# Patient Record
Sex: FEMALE | URBAN - METROPOLITAN AREA
[De-identification: names, ages, dates, MRNs, and addresses within clinical notes are randomized per-mention and may not be internally consistent; named-entity substitution may affect disease eponyms.]

---

## 2018-01-30 ENCOUNTER — EMERGENCY (EMERGENCY)
Facility: HOSPITAL | Age: 48
LOS: 1 days | Discharge: HOME | End: 2018-01-30
Attending: EMERGENCY MEDICINE
Payer: COMMERCIAL

## 2018-01-30 VITALS
OXYGEN SATURATION: 99 % | WEIGHT: 141.98 LBS | RESPIRATION RATE: 18 BRPM | SYSTOLIC BLOOD PRESSURE: 106 MMHG | DIASTOLIC BLOOD PRESSURE: 68 MMHG | HEART RATE: 104 BPM | TEMPERATURE: 99 F

## 2018-01-30 PROCEDURE — 73630 X-RAY EXAM OF FOOT: CPT | Mod: 26,RT

## 2018-01-30 PROCEDURE — 73630 X-RAY EXAM OF FOOT: CPT

## 2018-01-30 PROCEDURE — 99053 MED SERV 10PM-8AM 24 HR FAC: CPT

## 2018-01-30 PROCEDURE — 99284 EMERGENCY DEPT VISIT MOD MDM: CPT | Mod: 25

## 2018-01-30 PROCEDURE — 99283 EMERGENCY DEPT VISIT LOW MDM: CPT | Mod: 25

## 2018-01-30 RX ORDER — IBUPROFEN 200 MG
1 TABLET ORAL
Qty: 20 | Refills: 0
Start: 2018-01-30

## 2018-01-30 RX ORDER — IBUPROFEN 200 MG
600 TABLET ORAL ONCE
Refills: 0 | Status: COMPLETED | OUTPATIENT
Start: 2018-01-30 | End: 2018-01-30

## 2018-01-30 RX ADMIN — Medication 600 MILLIGRAM(S): at 03:02

## 2018-01-30 NOTE — ED PROVIDER NOTE - PHYSICAL EXAMINATION
Right foot: + dorsal area tenderness and mild swelling.   no bony deformities, no leg length discrepancy, femoral and pedal pulses intact, cap refill  < 2 secs.

## 2018-01-30 NOTE — ED PROVIDER NOTE - OBJECTIVE STATEMENT
Pt struck dorsal aspect of right foot on metal chair yesterday evening. =tenderness and swelling. No calf tenderness. no shortness of breath, no chest pain. Pt is no on BCP and denies sedentary life style.

## 2018-01-30 NOTE — ED PROVIDER NOTE - MEDICAL DECISION MAKING DETAILS
Rx IBU, F/u with podiatry- contact provided. Pt limping, ACE wrap and hard shoe applied. Pt is well appearing proper crutch usage noted. , stable for discharge and follow up with medical doctor. Pt educated on care and need for follow up. Discussed anticipatory guidance and return precautions. Questions answered. I had a detailed discussion with the patient and/or guardian regarding the historical points, exam findings, and any diagnostic results supporting the discharge diagnosis.

## 2019-10-23 ENCOUNTER — EMERGENCY (EMERGENCY)
Facility: HOSPITAL | Age: 49
LOS: 1 days | Discharge: HOME | End: 2019-10-23
Attending: EMERGENCY MEDICINE
Payer: COMMERCIAL

## 2019-10-23 VITALS
SYSTOLIC BLOOD PRESSURE: 132 MMHG | OXYGEN SATURATION: 99 % | RESPIRATION RATE: 16 BRPM | HEART RATE: 78 BPM | WEIGHT: 145.06 LBS | HEIGHT: 70 IN | DIASTOLIC BLOOD PRESSURE: 90 MMHG | TEMPERATURE: 98 F

## 2019-10-23 PROCEDURE — 99283 EMERGENCY DEPT VISIT LOW MDM: CPT

## 2019-10-23 PROCEDURE — 99284 EMERGENCY DEPT VISIT MOD MDM: CPT

## 2019-10-23 RX ORDER — CLINDAMYCIN PHOSPHATE GEL USP, 1% 10 MG/G
1 GEL TOPICAL
Qty: 7 | Refills: 0
Start: 2019-10-23 | End: 2019-10-29

## 2019-10-23 RX ORDER — ACETAMINOPHEN 500 MG
3 TABLET ORAL
Qty: 100 | Refills: 0
Start: 2019-10-23

## 2019-10-23 NOTE — ED ADULT TRIAGE NOTE - CHIEF COMPLAINT QUOTE
came in w/ c/o left tampon inside her vagina does not know how many days. denies any discharge/fever nor abdominal pain

## 2019-10-23 NOTE — ED PROVIDER NOTE - OBJECTIVE STATEMENT
Pt suspects she has retained tampon in vagina x 1 week. +malodor from vagina. No fever, no vomiting, denies vaginal discharge or bleeding.

## 2019-10-23 NOTE — ED PROVIDER NOTE - CLINICAL SUMMARY MEDICAL DECISION MAKING FREE TEXT BOX
Tampon removed no complications. Pt is well appearing walking with normal gait, stable for discharge and follow up with medical doctor. Pt educated on care and need for follow up. Discussed anticipatory guidance and return precautions. Questions answered. I had a detailed discussion with the patient and/or guardian regarding the historical points, exam findings, and any diagnostic results supporting the discharge diagnosis.

## 2019-10-23 NOTE — ED PROVIDER NOTE - PHYSICAL EXAMINATION
No labial lesions, os closed, no CMT, no adnexal tenderness or masses, no bleeding or d/c.  =retained tampon in vagina. Female chaperone present: Marilee PCA.

## 2019-10-23 NOTE — ED PROVIDER NOTE - PATIENT PORTAL LINK FT
You can access the FollowMyHealth Patient Portal offered by [facility] by registering at the following website: [email or web address]. By joining [employer]’s FollowMyHealth portal, you will also be able to view your health information using other applications (apps) compatible with our system.

## 2019-10-23 NOTE — ED ADULT TRIAGE NOTE - WEIGHT IN KG
PRINCIPAL PROCEDURE  Procedure: Diagnostic laparoscopy  Findings and Treatment: no sex nothing in vagina no heavy lifting no pushing eat high fiber food ambulation daily as tolerated, remove dressing tomorrow, leave steri strips on, shower daily clean wound well and keep dry after; see your OB in 2 days for repeat BHCG and  in 2wks for postop follow up
65.8

## 2023-06-05 PROBLEM — Z00.00 ENCOUNTER FOR PREVENTIVE HEALTH EXAMINATION: Status: ACTIVE | Noted: 2023-06-05

## 2023-06-05 PROBLEM — I82.409 ACUTE EMBOLISM AND THROMBOSIS OF UNSPECIFIED DEEP VEINS OF UNSPECIFIED LOWER EXTREMITY: Chronic | Status: ACTIVE | Noted: 2018-01-30

## 2023-06-05 PROBLEM — I49.3 VENTRICULAR PREMATURE DEPOLARIZATION: Chronic | Status: ACTIVE | Noted: 2018-01-30

## 2023-06-28 ENCOUNTER — APPOINTMENT (OUTPATIENT)
Dept: ORTHOPEDIC SURGERY | Facility: CLINIC | Age: 53
End: 2023-06-28

## 2024-02-08 ENCOUNTER — APPOINTMENT (OUTPATIENT)
Dept: ORTHOPEDIC SURGERY | Facility: CLINIC | Age: 54
End: 2024-02-08

## 2024-02-16 ENCOUNTER — APPOINTMENT (OUTPATIENT)
Dept: PAIN MANAGEMENT | Facility: CLINIC | Age: 54
End: 2024-02-16
Payer: COMMERCIAL

## 2024-02-16 VITALS — HEIGHT: 70 IN | WEIGHT: 155 LBS | BODY MASS INDEX: 22.19 KG/M2

## 2024-02-16 PROCEDURE — 99204 OFFICE O/P NEW MOD 45 MIN: CPT

## 2024-02-16 NOTE — DISCUSSION/SUMMARY
[de-identified] : After discussing various treatment options with the patient including but not limited to oral medications, physical therapy, exercise modalities as well as interventional spinal injections, we have decided with the following plan:  - Continue Home exercises, stretching, activity modification, physical therapy, and conservative care. - MRI report and/or images was reviewed and discussed with the patient. - Recommend Right L3-4, L4-5 Transforaminal Epidural Steroid Injection under fluoroscopic guidance with image. - The risks, benefits and alternatives of the proposed procedure were explained in detail with the patient. The risks outlined include but are not limited to infection, bleeding, post-dural puncture headache, nerve injury, a temporary increase in pain, failure to resolve symptoms, allergic reaction, symptom recurrence, and possible elevation of blood sugar in diabetics. All questions were answered to patient's apparent satisfaction and he/she verbalized an understanding. - Patient is presenting with acute/sub-acute radicular pain with impairment in ADLs and functionality.  The pain has not responded to conservative care including NSAID therapy and/or physical therapy.  There is no bleeding tendency, unstable medical condition, or systemic infection. - Follow up in 1-2 weeks post injection for re-evaluation.

## 2024-02-16 NOTE — PHYSICAL EXAM
[de-identified] : Constitutional; Appears well, no apparent distress Ability to communicate: Normal  Respiratory: non-labored breathing Skin: No rash noted Head: Normocephalic, atraumatic Neck: no visible thyroid enlargement Eyes: Extraocular movements intact Neurologic: Alert and oriented x3 Psychiatric: normal mood, affect and behavior [] : non-antalgic

## 2024-02-16 NOTE — REVIEW OF SYSTEMS
"Pt mack removed this morning. Pt urinated directly in the toilet and did not measure. He stated "it was a good amount."   " [Negative] : Heme/Lymph

## 2024-02-16 NOTE — HISTORY OF PRESENT ILLNESS
[Lower back] : lower back [8] : 8 [7] : 7 [Dull/Aching] : dull/aching [Sharp] : sharp [Shooting] : shooting [Intermittent] : intermittent [Sleep] : sleep [Physical therapy] : physical therapy [Standing] : standing [Walking] : walking [FreeTextEntry1] : Initial HPI 02/16/2024: Pain started a few years ago but worsened over the past few months. Pain is on the RIGHT side of the lower back and radiates into the right buttock and down the right anterior and lateral thigh and lower leg to the calf described as a sharp shooting pain with associated numbness and tingling.   MRI Lumbar Spine 2/7/24 independently reviewed: L3-4, L4-5 disc bulge with left HNP  Conservative Care: has been doing PT which is making it worse Pain Medications: gabapentin 300mg BID but was too drowsy; ibuprofen PRN; completed MDP with no relief  Past Injections: none Spine surgery: none  Blood thinners: none [] : Patient is currently injured and not playing sports: no [FreeTextEntry7] : RT LEG  [de-identified] : L MRI

## 2024-02-26 ENCOUNTER — APPOINTMENT (OUTPATIENT)
Dept: PAIN MANAGEMENT | Facility: CLINIC | Age: 54
End: 2024-02-26

## 2024-03-12 ENCOUNTER — APPOINTMENT (OUTPATIENT)
Dept: PAIN MANAGEMENT | Facility: CLINIC | Age: 54
End: 2024-03-12
Payer: COMMERCIAL

## 2024-03-12 PROCEDURE — 64484 NJX AA&/STRD TFRM EPI L/S EA: CPT | Mod: RT,59

## 2024-03-12 PROCEDURE — J3490M: CUSTOM

## 2024-03-12 PROCEDURE — 64483 NJX AA&/STRD TFRM EPI L/S 1: CPT | Mod: RT

## 2024-03-12 NOTE — PROCEDURE
[FreeTextEntry3] : Date of Service: 03/12/2024   Account: [id number]  Patient: ANTOLIN HASTINGS   YOB: 1970  Age: 53 year   Surgeon:                                                         Robson Mi D.O.  Assistant:                                                         None.  Pre-Operative Diagnosis:                             Lumbosacral radiculitis (M54.17)  Post-Operative Diagnosis:                           Same  Procedure:                                                      Right L3-4, L4-5 transforaminal epidural steroid injection under fluoroscopic guidance.  Anesthesia:                                                     Local with MAC   This procedure was carried out using fluoroscopic guidance.  The risks and benefits of the procedure were discussed extensively with the patient.  The consent of the patient was obtained and the following procedure was performed. The patient was placed in the prone position on the fluoroscopic table and the lumbar area was prepped and draped in a sterile fashion. A timeout was performed with all essential staff present and the site and side were verified.  The right L3-4 neural foramen was identified on right oblique "shanna dog" anatomical view at the 6 o'clock position using fluoroscopic guidance, and the area was marked. The overlying skin and subcutaneous structures were anesthetized using sterile technique with 1% Lidocaine.  A 22-gauge spinal needle was directed toward the inferior (6 o'clock) position of the pedicle, which formed the roof of the identified foramen.  Once in the epidural space, after negative aspiration for heme and CSF, 1 cc of Omnipaque contrast was injected under live fluoroscopy to confirm epidural location and assess filling defects and rule out intravascular needle placement. The following epidurogram observed: no intravascular or intrathecal flow pattern was noted.  No blood or CSF was aspirated. Contrast spread medially in epidural space.    After this, an injectate of 1 cc preservative free normal saline plus 6 mg of betamethasone and 1 cc of 0.25% bupivacaine was injected in the epidural space.  The right L4-5 neural foramen was then identified on right oblique "shanna dog" anatomical view at the 6 o'clock position using fluoroscopic guidance, and the area was marked. The overlying skin and subcutaneous structures were anesthetized using sterile technique with 1% Lidocaine.  A 22-gauge spinal needle was directed toward the inferior (6 o'clock) position of the pedicle, which formed the roof of the identified foramen.  Once in the epidural space, after negative aspiration for heme and CSF, 1cc of Omnipaque contrast was injected under live fluoroscopy to confirm epidural location and assess filling defects and rule out intravascular needle placement. The following epidurogram observed: no intravascular or intrathecal flow pattern was noted.  No blood or CSF was aspirated. Contrast spread medially in epidural space.    After this, an injectate of 1 cc preservative free normal saline plus 6 mg of betamethasone and 1 cc of 0.25% bupivacaine was injected in the epidural space.  The needle was subsequently removed.  Vital signs remained normal.  Pulse oximeter was used throughout the procedure and the patient's pulse and oxygen saturation remained within normal limits.  The patient tolerated the procedure well.  There were no complications.  The patient was instructed to apply ice over the injection sites for twenty minutes every two hours for the next 24 to 48 hours.  The patient was also instructed to contact me immediately if there were any problems.  Robson Mi D.O.

## 2024-03-29 ENCOUNTER — APPOINTMENT (OUTPATIENT)
Dept: PAIN MANAGEMENT | Facility: CLINIC | Age: 54
End: 2024-03-29
Payer: COMMERCIAL

## 2024-03-29 VITALS — WEIGHT: 155 LBS | HEIGHT: 70 IN | BODY MASS INDEX: 22.19 KG/M2

## 2024-03-29 PROCEDURE — 99213 OFFICE O/P EST LOW 20 MIN: CPT

## 2024-03-29 RX ORDER — METHOCARBAMOL 750 MG/1
750 TABLET, FILM COATED ORAL TWICE DAILY
Qty: 60 | Refills: 0 | Status: ACTIVE | COMMUNITY
Start: 2024-03-29 | End: 1900-01-01

## 2024-03-29 NOTE — DISCUSSION/SUMMARY
[de-identified] : After discussing various treatment options with the patient including but not limited to oral medications, physical therapy, exercise modalities as well as interventional spinal injections, we have decided with the following plan:  - Continue home exercises, stretching, activity modification, physical therapy, and conservative care. - Follow-up as needed. - Will prescribe Methocarbamol (Robaxin) 750mg TID PRN for muscle spasms and to assist with pain relief.

## 2024-03-29 NOTE — HISTORY OF PRESENT ILLNESS
[Lower back] : lower back [8] : 8 [7] : 7 [Dull/Aching] : dull/aching [Sharp] : sharp [Intermittent] : intermittent [Shooting] : shooting [Sleep] : sleep [Physical therapy] : physical therapy [Standing] : standing [Walking] : walking [FreeTextEntry1] : 03/29/2024: s/p RIGHT L3-4 L4-5 TFESI on 03/12/24 with 50% relief and improvement of ADLs. Pain is less severe and more intermittent.   Initial HPI 02/16/2024: Pain started a few years ago but worsened over the past few months. Pain is on the RIGHT side of the lower back and radiates into the right buttock and down the right anterior and lateral thigh and lower leg to the calf described as a sharp shooting pain with associated numbness and tingling.   MRI Lumbar Spine 2/7/24 independently reviewed: L3-4, L4-5 disc bulge with left HNP  Conservative Care: has been doing PT which is making it worse Pain Medications: gabapentin 300mg BID but was too drowsy; ibuprofen PRN; completed MDP with no relief; Mobic with no relief  Past Injections: none Spine surgery: none  Blood thinners: none [] : no [FreeTextEntry7] : RT LEG  [de-identified] : L MRI  [TWNoteComboBox1] : 50%

## 2024-03-29 NOTE — PHYSICAL EXAM
[de-identified] : Constitutional; Appears well, no apparent distress Ability to communicate: Normal  Respiratory: non-labored breathing Skin: No rash noted Head: Normocephalic, atraumatic Neck: no visible thyroid enlargement Eyes: Extraocular movements intact Neurologic: Alert and oriented x3 Psychiatric: normal mood, affect and behavior [] : non-antalgic

## 2024-04-03 PROBLEM — M54.50 LOW BACK PAIN, UNSPECIFIED BACK PAIN LATERALITY, UNSPECIFIED CHRONICITY, UNSPECIFIED WHETHER SCIATICA PRESENT: Status: ACTIVE | Noted: 2024-02-16

## 2024-04-04 ENCOUNTER — APPOINTMENT (OUTPATIENT)
Dept: PAIN MANAGEMENT | Facility: CLINIC | Age: 54
End: 2024-04-04
Payer: COMMERCIAL

## 2024-04-04 VITALS — BODY MASS INDEX: 22.19 KG/M2 | WEIGHT: 155 LBS | HEIGHT: 70 IN

## 2024-04-04 DIAGNOSIS — Z78.9 OTHER SPECIFIED HEALTH STATUS: ICD-10-CM

## 2024-04-04 DIAGNOSIS — Z80.9 FAMILY HISTORY OF MALIGNANT NEOPLASM, UNSPECIFIED: ICD-10-CM

## 2024-04-04 DIAGNOSIS — M54.50 LOW BACK PAIN, UNSPECIFIED: ICD-10-CM

## 2024-04-04 PROCEDURE — 99214 OFFICE O/P EST MOD 30 MIN: CPT

## 2024-04-04 RX ORDER — DICLOFENAC SODIUM 75 MG/1
75 TABLET, DELAYED RELEASE ORAL
Qty: 60 | Refills: 1 | Status: ACTIVE | COMMUNITY
Start: 2024-04-04 | End: 1900-01-01

## 2024-04-04 NOTE — DATA REVIEWED
[MRI] : MRI [Lumbar Spine] : lumbar spine [Report was reviewed and noted in the chart] : The report was reviewed and noted in the chart [I independently reviewed and interpreted images and report] : I independently reviewed and interpreted images and report [FreeTextEntry1] : 2/7/2024 (R) FINDINGS:  For purposes of this dictation, the last well-formed disc space will be labeled L5-S1.  OSSEOUS STRUCTURES: Vertebral body heights are preserved. No marrow edema or destructive marrow infiltrative process. 1.7 cm, left-sided L2 and smaller, L1 and L4 typical vertebral body hemangiomas are present. Benign finding.  ALIGNMENT: Normal lumbar lordosis is preserved.  No significant scoliosis. No spondylolisthesis. No spondylolysis within the limitations of MRI.  SPINAL CORD AND CONUS MEDULLARIS: Conus medullaris is at T12-L1. Distal thoracic spinal cord and conus medullaris are unremarkable.  PARASPINAL AND INTRA-ABDOMINAL SOFT TISSUES: No paraspinal masses.  INCLUDED THORACIC SPINE AND SACRUM: Unremarkable.  DISCS: Mild L3-4 L4-5 disc space narrowing.  The following axial levels are imaged and detailed below:  L1-L2: No disc bulging or herniation. No spinal canal or foraminal stenosis.  L2-L3: No disc bulging or herniation. No spinal canal or foraminal stenosis.  L3-L4: Mild disc bulge with superimposed small inferior left foraminal disc herniation (sagittal T2 FSE image 6, series 5). Mild left-sided foraminal narrowing. Right neural foramen patent. No spinal stenosis.  L4-L5: Mild disc bulging with superimposed small left foraminal disc herniation with mild/moderate left-sided foraminal narrowing. Right neural foramen patent. No spinal stenosis.  L5-S1: Minimal disc bulge. No disc herniation, spinal stenosis or foraminal narrowing.  IMPRESSION: 1. Mild L3-4 disc bulge with superimposed small inferior left foraminal disc herniation with mild left-sided foraminal narrowing. 2. Mild L4-5 disc bulge with superimposed small left foraminal disc herniation with mild/moderate left-sided foraminal narrowing.  X-ray hips (6/7/2023) IMPRESSION:   Minimal osteoarthropathy of the hips is identified.  No acute fracture is observed.  Mild hypertrophic arthropathy of the sacroiliac joints is observed.  No lytic or blastic lesion is identified.  Mild lower lumbar spondylosis is observed.

## 2024-04-04 NOTE — PHYSICAL EXAM
[de-identified] : Gen: NAD Head: NC/AT Eyes: no glasses, no scleral icterus ENT: mucous membranes moist CV: No JVD Lungs: nonlabored breathing Abd: soft, NT/ND Ext: full ROM in all extremities, no peripheral edema Back: +TTP in the right low lumbar facet region; +SLR on the right, +facet loading on the right Neuro: CN intact LEs +5 L +5 R hip flexion +5 L +5 R leg extension +5 L +5 R leg flexion +5 L +5 R foot dorsiflexion +5 L +5 R foot plantarflexion +5 L +5 R EHL extension Psych: normal affect Skin: no visible lesions

## 2024-04-04 NOTE — HISTORY OF PRESENT ILLNESS
[Lower back] : lower back [Buttock] : buttock [Right Leg] : right leg [5] : 5 [Burning] : burning [Dull/Aching] : dull/aching [Radiating] : radiating [Throbbing] : throbbing [Tightness] : tightness [Tingling] : tingling [Constant] : constant [Household chores] : household chores [Leisure] : leisure [Work] : work [Sleep] : sleep [Rest] : rest [Ice] : ice [Injection therapy] : injection therapy [Sitting] : sitting [Walking] : walking [Full time] : Work status: full time [] : no [FreeTextEntry1] : right hip;  [FreeTextEntry6] : numbness  [FreeTextEntry7] : right hip and leg

## 2024-04-04 NOTE — DISCUSSION/SUMMARY
[de-identified] : ANTOLIN HASTINGS is a 53 year-old woman presenting for a RPV for a history of chronic low back pain with radicular features.   Prior treatment: 3/12/2024: RIGHT L3-L4, L4-L5 TFESI w/ some short term improvement of pain Physical therapy Gabapentin - some sedation Ibuprofen Methocarbamol  Plan: 1) MRI lumbar spine images reviewed with the patient. 2) Schedule L4-L5 ILESI w/o MAC. The procedure was explained to the patient in detail. Reviewed risks, benefits, and alternatives with the patient. Some risks discussed included temporary increase in pain, bleeding, infection, and side effects from steroids. The patient expressed understanding and would like to proceed.  3) Trial diclofenac 75mg BID prn; Discussed the risks of NSAIDs, including worsening HTN, cardiovascular risks, GI risk, renal injury. The patient was told not to take other NSAIDs with this and to consult with their PCP if there is a significant medical history to warrant this prior to initiating treatment.  4) Continue methocarbamol 750mg prn 5) Continue gabapentin 300mg qhs  6) Discussed a variety of exercises and regular stretches and printed out various extension/flexion exercises and core strengthening exercises. Patient will start a daily stretching routine and eventually resume cardio workout. Also discussed the importance of stress reduction and good sleep hygiene. 7) RTC post procedure

## 2024-04-09 RX ORDER — GABAPENTIN 100 MG/1
100 CAPSULE ORAL 3 TIMES DAILY
Qty: 90 | Refills: 1 | Status: ACTIVE | COMMUNITY
Start: 2024-04-04 | End: 1900-01-01

## 2024-04-23 ENCOUNTER — APPOINTMENT (OUTPATIENT)
Dept: PAIN MANAGEMENT | Facility: CLINIC | Age: 54
End: 2024-04-23
Payer: COMMERCIAL

## 2024-04-23 PROCEDURE — 62323 NJX INTERLAMINAR LMBR/SAC: CPT

## 2024-04-23 NOTE — PROCEDURE
[FreeTextEntry1] : L4-L5 lumbar interlaminar epidural steroid injection [FreeTextEntry2] : chronic lumbar radiculopathy [FreeTextEntry3] : Procedure Date: 04/23/2024   Preoperative Diagnosis: chronic low back pain with bilateral sciatica   Procedure: L4-L5 epidural steroid injection under fluoroscopic guidance   Anesthesia: local   Complications: none   EBL: none   Procedure in detail: Patient was seen and examined. Risks, benefits, and alternatives for the procedure were discussed with the patient in detail. The patient expressed understanding, gave written and verbal consent, and placed themselves in a prone position on the procedure table. Skin overlying the lumbosacral spine was prepped with chloraprep and draped in the usual sterile fashion. Fluoroscopic images were obtained to identify the L4 and L5 vertebral body. Target was the interlaminar space between the L4 and L5 vertebral body. Skin overlying the target was marked and infiltrated with 1% lidocaine. Using an 20 gauge, 9cm Tuohy needle, this was inserted and advanced under intermittent fluoroscopic guidance. When felt to be engaged in the epidural space, lateral view was used to confirm depth. Needle was advanced until loss of resistance to saline was achieved. After negative aspiration for heme/csf, contrast was injected under live fluoroscopy, which showed contrast spread consistent with epidural flow. No evidence of intravascular or intrathecal uptake. At this point, a total of 3ml of injectate, which consisted of 1ml of 80mg/ml depomedrol and 2ml of 0.25% bupivacaine were injected. The needle was restyletted and withdrawn, a band aid was placed over the injection site. Patient tolerated the procedure well. The patient recovered uneventfully and was discharged home in stable condition.

## 2024-05-06 ENCOUNTER — APPOINTMENT (OUTPATIENT)
Dept: PAIN MANAGEMENT | Facility: CLINIC | Age: 54
End: 2024-05-06
Payer: COMMERCIAL

## 2024-05-06 VITALS — BODY MASS INDEX: 23.34 KG/M2 | WEIGHT: 163 LBS | HEIGHT: 70 IN

## 2024-05-06 PROCEDURE — 99213 OFFICE O/P EST LOW 20 MIN: CPT

## 2024-05-06 NOTE — DISCUSSION/SUMMARY
[de-identified] : ANTOLIN HASTINGS is a 53 year-old woman presenting for a RPV for a history of chronic low back pain with radicular features.   Prior treatment: 4/23/2024: L4-L5 ILESI w/o MAC w/ 70% improvement of pain and function 3/12/2024: RIGHT L3-L4, L4-L5 TFESI w/ some short term improvement of pain Physical therapy Gabapentin - some sedation Ibuprofen Methocarbamol Ice, Heat  Plan: 1) MRI lumbar spine images reviewed with the patient. 2) May consider repeat L4-L5 ILESI w/o MAC in the future if needed 3) Continue ibuprofen 600mg prn; denies AEs 4) Continue methocarbamol 750mg prn 5) Patient has stopped gabapentin 300mg qhs; may consider restarting  6) Discussed a variety of exercises and regular stretches and printed out various extension/flexion exercises and core strengthening exercises. Patient will start a daily stretching routine and eventually resume cardio workout. Also discussed the importance of stress reduction and good sleep hygiene. 7) RTC 6 weeks

## 2024-05-06 NOTE — HISTORY OF PRESENT ILLNESS
[Lower back] : lower back [Buttock] : buttock [Right Leg] : right leg [5] : 5 [Burning] : burning [Dull/Aching] : dull/aching [Radiating] : radiating [Throbbing] : throbbing [Tightness] : tightness [Tingling] : tingling [Constant] : constant [Household chores] : household chores [Leisure] : leisure [Work] : work [Sleep] : sleep [Rest] : rest [Ice] : ice [Injection therapy] : injection therapy [Sitting] : sitting [Walking] : walking [Full time] : Work status: full time [6] : 6 [] : no [FreeTextEntry1] : right hip;  [FreeTextEntry6] : numbness  [FreeTextEntry7] : right hip and leg

## 2024-05-06 NOTE — PHYSICAL EXAM
[de-identified] : Gen: NAD Head: NC/AT Eyes: no glasses, no scleral icterus ENT: mucous membranes moist CV: No JVD Lungs: nonlabored breathing Abd: soft, NT/ND Ext: full ROM in all extremities, no peripheral edema Back: +TTP in the right low lumbar facet region; +SLR on the right, +facet loading on the right Neuro: CN intact LEs +5 L +5 R hip flexion +5 L +5 R leg extension +5 L +5 R leg flexion +5 L +5 R foot dorsiflexion +5 L +5 R foot plantarflexion +5 L +5 R EHL extension Psych: normal affect Skin: no visible lesions

## 2024-05-12 ENCOUNTER — NON-APPOINTMENT (OUTPATIENT)
Age: 54
End: 2024-05-12

## 2024-06-05 PROBLEM — M54.17 LUMBOSACRAL RADICULITIS: Status: ACTIVE | Noted: 2024-02-16

## 2024-06-05 PROBLEM — M51.16 LUMBAR DISC DISEASE WITH RADICULOPATHY: Status: ACTIVE | Noted: 2024-05-02

## 2024-06-06 ENCOUNTER — APPOINTMENT (OUTPATIENT)
Dept: PAIN MANAGEMENT | Facility: CLINIC | Age: 54
End: 2024-06-06
Payer: COMMERCIAL

## 2024-06-06 VITALS — WEIGHT: 164 LBS | BODY MASS INDEX: 23.48 KG/M2 | HEIGHT: 70 IN

## 2024-06-06 DIAGNOSIS — M54.17 RADICULOPATHY, LUMBOSACRAL REGION: ICD-10-CM

## 2024-06-06 DIAGNOSIS — M51.16 INTERVERTEBRAL DISC DISORDERS WITH RADICULOPATHY, LUMBAR REGION: ICD-10-CM

## 2024-06-06 DIAGNOSIS — M25.511 PAIN IN RIGHT SHOULDER: ICD-10-CM

## 2024-06-06 DIAGNOSIS — G89.29 PAIN IN RIGHT SHOULDER: ICD-10-CM

## 2024-06-06 PROCEDURE — 99214 OFFICE O/P EST MOD 30 MIN: CPT

## 2024-06-06 NOTE — DISCUSSION/SUMMARY
[de-identified] : ANTOLIN HASTINGS is a 53 year-old woman presenting for a RPV for a history of chronic low back pain with radicular features.   Prior treatment: 4/23/2024: L4-L5 ILESI w/o MAC w/ 70% improvement of pain and function 3/12/2024: RIGHT L3-L4, L4-L5 TFESI w/ some short term improvement of pain Physical therapy Gabapentin - some sedation Ibuprofen Methocarbamol Ice, Heat  Plan: 1) Initiate physical therapy for right shoulder - script provided 2) Consider MRI right shoulder if no improvement with the above 3) May consider repeat L4-L5 ILESI w/o MAC in the future if needed 4) Continue diclofenac 75mg BID prn; denies AEs 5) Continue methocarbamol 750mg prn 6) Continue gabapentin 100mg 1-2 tabs QHS denies AEs 7) Continue home exercise regimen 8) RTC 6 weeks

## 2024-06-06 NOTE — HISTORY OF PRESENT ILLNESS
[Lower back] : lower back [Buttock] : buttock [Right Leg] : right leg [6] : 6 [Burning] : burning [Dull/Aching] : dull/aching [Radiating] : radiating [Throbbing] : throbbing [Tightness] : tightness [Tingling] : tingling [Constant] : constant [Household chores] : household chores [Leisure] : leisure [Work] : work [Sleep] : sleep [Rest] : rest [Ice] : ice [Injection therapy] : injection therapy [Sitting] : sitting [Walking] : walking [Full time] : Work status: full time [5] : 5 [3] : 3 [] : no [FreeTextEntry1] : right hip;  [FreeTextEntry6] : numbness  [FreeTextEntry7] : right hip and leg

## 2024-06-06 NOTE — PHYSICAL EXAM
[de-identified] : Gen: NAD Head: NC/AT Eyes: no glasses, no scleral icterus ENT: mucous membranes moist CV: No JVD Lungs: nonlabored breathing Abd: soft, NT/ND Ext: full ROM in all extremities, no peripheral edema Back: +TTP in the right low lumbar facet region; +SLR on the right, +facet loading on the right Neuro: CN intact LEs +5 L +5 R hip flexion +5 L +5 R leg extension +5 L +5 R leg flexion +5 L +5 R foot dorsiflexion +5 L +5 R foot plantarflexion +5 L +5 R EHL extension Psych: normal affect Skin: no visible lesions

## 2024-06-17 ENCOUNTER — APPOINTMENT (OUTPATIENT)
Dept: PAIN MANAGEMENT | Facility: CLINIC | Age: 54
End: 2024-06-17

## 2024-07-02 RX ORDER — TRAMADOL HYDROCHLORIDE 50 MG/1
50 TABLET, COATED ORAL
Qty: 20 | Refills: 0 | Status: ACTIVE | COMMUNITY
Start: 2024-07-02 | End: 1900-01-01

## 2024-07-18 ENCOUNTER — APPOINTMENT (OUTPATIENT)
Dept: PAIN MANAGEMENT | Facility: CLINIC | Age: 54
End: 2024-07-18

## 2024-07-18 ENCOUNTER — APPOINTMENT (OUTPATIENT)
Dept: PAIN MANAGEMENT | Facility: CLINIC | Age: 54
End: 2024-07-18
Payer: COMMERCIAL

## 2024-07-18 ENCOUNTER — TRANSCRIPTION ENCOUNTER (OUTPATIENT)
Age: 54
End: 2024-07-18

## 2024-07-18 DIAGNOSIS — M54.12 RADICULOPATHY, CERVICAL REGION: ICD-10-CM

## 2024-07-18 DIAGNOSIS — M51.16 INTERVERTEBRAL DISC DISORDERS WITH RADICULOPATHY, LUMBAR REGION: ICD-10-CM

## 2024-07-18 DIAGNOSIS — M54.17 RADICULOPATHY, LUMBOSACRAL REGION: ICD-10-CM

## 2024-07-18 PROCEDURE — 62323 NJX INTERLAMINAR LMBR/SAC: CPT

## 2024-07-26 ENCOUNTER — APPOINTMENT (OUTPATIENT)
Dept: MRI IMAGING | Facility: CLINIC | Age: 54
End: 2024-07-26

## 2024-08-01 ENCOUNTER — APPOINTMENT (OUTPATIENT)
Dept: PAIN MANAGEMENT | Facility: CLINIC | Age: 54
End: 2024-08-01
Payer: COMMERCIAL

## 2024-08-01 VITALS — HEIGHT: 70 IN | BODY MASS INDEX: 23.34 KG/M2 | WEIGHT: 163 LBS

## 2024-08-01 DIAGNOSIS — M54.12 RADICULOPATHY, CERVICAL REGION: ICD-10-CM

## 2024-08-01 DIAGNOSIS — M25.511 PAIN IN RIGHT SHOULDER: ICD-10-CM

## 2024-08-01 DIAGNOSIS — M54.50 LOW BACK PAIN, UNSPECIFIED: ICD-10-CM

## 2024-08-01 DIAGNOSIS — M54.17 RADICULOPATHY, LUMBOSACRAL REGION: ICD-10-CM

## 2024-08-01 DIAGNOSIS — G89.29 PAIN IN RIGHT SHOULDER: ICD-10-CM

## 2024-08-01 DIAGNOSIS — M51.16 INTERVERTEBRAL DISC DISORDERS WITH RADICULOPATHY, LUMBAR REGION: ICD-10-CM

## 2024-08-01 PROCEDURE — 99214 OFFICE O/P EST MOD 30 MIN: CPT

## 2024-08-01 NOTE — DISCUSSION/SUMMARY
[de-identified] : ANTOLIN HASTINGS is a 53 year-old woman presenting for a RPV for a history of chronic low back pain with radicular features.   Prior treatment: 7/18/2024 L4-L5 ILESI w/o MAC w/ 50% improvement of pain and function  4/23/2024: L4-L5 ILESI w/o MAC w/ 70% improvement of pain and function 3/12/2024: RIGHT L3-L4, L4-L5 TFESI w/ some short term improvement of pain Physical therapy Gabapentin - some sedation Ibuprofen Methocarbamol Ice, Heat  Plan: 1) MRI cervical spine images reviewed with the patient. 2) Awaiting report from MRI right shoulder from 7/29 from [facility], will follow up; consider referral to ortho shoulder 3) May consider repeat L4-L5 ILESI w/o MAC in the future if needed 4) Referral to Ortho Spine 5) Continue diclofenac 75mg BID prn; denies AEs 6) Continue methocarbamol 750mg prn 7) Continue gabapentin 100mg 1-2 tabs QHS denies AEs 8) Continue home exercise regimen 9) RTC 6 weeks

## 2024-08-01 NOTE — HISTORY OF PRESENT ILLNESS
[Lower back] : lower back [Buttock] : buttock [Right Leg] : right leg [5] : 5 [3] : 3 [Burning] : burning [Dull/Aching] : dull/aching [Radiating] : radiating [Throbbing] : throbbing [Tightness] : tightness [Tingling] : tingling [Constant] : constant [Household chores] : household chores [Leisure] : leisure [Work] : work [Sleep] : sleep [Rest] : rest [Ice] : ice [Injection therapy] : injection therapy [Sitting] : sitting [Walking] : walking [Full time] : Work status: full time [8] : 8 [4] : 4 [] : no [FreeTextEntry1] : right hip;  [FreeTextEntry6] : numbness  [FreeTextEntry7] : right hip and leg  [TWNoteComboBox1] : 0%

## 2024-08-01 NOTE — DATA REVIEWED
[Lumbar Spine] : lumbar spine [MRI] : MRI [Cervical Spine] : cervical spine [Report was reviewed and noted in the chart] : The report was reviewed and noted in the chart [I independently reviewed and interpreted images and report] : I independently reviewed and interpreted images and report [FreeTextEntry1] : 7/29/2024 MRI Cervical Spine ([facility]) FINDINGS: Exaggerated lordosis of the cervical spine. The vertebral bodies outline normally. The marrow signal demonstrates no evidence of an infiltrative or destructive marrow process. The cervical spinal cord demonstrates a normal course, caliber, and signal intensity. The visualized paravertebral soft tissues are grossly unremarkable.  C2-3: Posterocentral disc herniation contacting the spinal cord The neural foramina are patent. The facet joints are within normal limits.  C3-4: Posterocentral disc herniation exerting pressure on the thecal sac. The neural foramina are patent. The facet joints are within normal limits.  C4-5: Disc bulging exerting pressure on the thecal sac. The neural foramina are patent. The facet joints are within normal limits.  C5-6: Grade 1 retrolisthesis at this level. Based disc herniation with an annular tear exerting pressure on the thecal sac. The neural foramina are patent. The facet joints are within normal limits.  C6-7: Disc bulging exerting pressure on the thecal sac. Mild left foraminal stenosis. The facet joints are within normal limits.  C7-T1: No evidence of disc herniation. The neural foramina are patent. The facet joints are within normal limits.   IMPRESSION: 1.  Posterocentral disc herniation contacting the spinal cord, C2-3. 2.  Posterocentral disc herniation exerting pressure on the thecal sac, C3-4. 3.  Disc herniation with an annular tear exerting pressure on the thecal sac, C5-6. Grade 1 retrolisthesis at this level. 4.  Disc bulging, C6-7. Mild left foraminal stenosis. 5.  Exaggerated lordosis of the cervical spine.  2/7/2024 MRI Lumbar Spine ([facility]) FINDINGS:  For purposes of this dictation, the last well-formed disc space will be labeled L5-S1.  OSSEOUS STRUCTURES: Vertebral body heights are preserved. No marrow edema or destructive marrow infiltrative process. 1.7 cm, left-sided L2 and smaller, L1 and L4 typical vertebral body hemangiomas are present. Benign finding.  ALIGNMENT: Normal lumbar lordosis is preserved.  No significant scoliosis. No spondylolisthesis. No spondylolysis within the limitations of MRI.  SPINAL CORD AND CONUS MEDULLARIS: Conus medullaris is at T12-L1. Distal thoracic spinal cord and conus medullaris are unremarkable.  PARASPINAL AND INTRA-ABDOMINAL SOFT TISSUES: No paraspinal masses.  INCLUDED THORACIC SPINE AND SACRUM: Unremarkable.  DISCS: Mild L3-4 L4-5 disc space narrowing.  The following axial levels are imaged and detailed below:  L1-L2: No disc bulging or herniation. No spinal canal or foraminal stenosis.  L2-L3: No disc bulging or herniation. No spinal canal or foraminal stenosis.  L3-L4: Mild disc bulge with superimposed small inferior left foraminal disc herniation (sagittal T2 FSE image 6, series 5). Mild left-sided foraminal narrowing. Right neural foramen patent. No spinal stenosis.  L4-L5: Mild disc bulging with superimposed small left foraminal disc herniation with mild/moderate left-sided foraminal narrowing. Right neural foramen patent. No spinal stenosis.  L5-S1: Minimal disc bulge. No disc herniation, spinal stenosis or foraminal narrowing.  IMPRESSION: 1. Mild L3-4 disc bulge with superimposed small inferior left foraminal disc herniation with mild left-sided foraminal narrowing. 2. Mild L4-5 disc bulge with superimposed small left foraminal disc herniation with mild/moderate left-sided foraminal narrowing.  X-ray hips (6/7/2023) IMPRESSION:   Minimal osteoarthropathy of the hips is identified.  No acute fracture is observed.  Mild hypertrophic arthropathy of the sacroiliac joints is observed.  No lytic or blastic lesion is identified.  Mild lower lumbar spondylosis is observed.

## 2024-08-01 NOTE — PHYSICAL EXAM
[de-identified] : Gen: NAD Head: NC/AT Eyes: no glasses, no scleral icterus ENT: mucous membranes moist CV: No JVD Lungs: nonlabored breathing Abd: soft, NT/ND Ext: full ROM in all extremities, no peripheral edema Back: +TTP in the right low lumbar facet region; +SLR on the right, +facet loading on the right Neuro: CN intact LEs +5 L +5 R hip flexion +5 L +5 R leg extension +5 L +5 R leg flexion +5 L +5 R foot dorsiflexion +5 L +5 R foot plantarflexion +5 L +5 R EHL extension Psych: normal affect Skin: no visible lesions

## 2024-08-22 ENCOUNTER — APPOINTMENT (OUTPATIENT)
Dept: ORTHOPEDIC SURGERY | Facility: CLINIC | Age: 54
End: 2024-08-22
Payer: COMMERCIAL

## 2024-08-22 DIAGNOSIS — M46.1 SACROILIITIS, NOT ELSEWHERE CLASSIFIED: ICD-10-CM

## 2024-08-22 DIAGNOSIS — M70.62 TROCHANTERIC BURSITIS, RIGHT HIP: ICD-10-CM

## 2024-08-22 DIAGNOSIS — M70.61 TROCHANTERIC BURSITIS, RIGHT HIP: ICD-10-CM

## 2024-08-22 PROCEDURE — 20552 NJX 1/MLT TRIGGER POINT 1/2: CPT | Mod: RT,59

## 2024-08-22 PROCEDURE — 99205 OFFICE O/P NEW HI 60 MIN: CPT | Mod: 25

## 2024-08-22 PROCEDURE — 20610 DRAIN/INJ JOINT/BURSA W/O US: CPT | Mod: RT

## 2024-08-22 RX ORDER — DICLOFENAC SODIUM 10 MG/G
1 GEL TOPICAL DAILY
Qty: 1 | Refills: 3 | Status: ACTIVE | COMMUNITY
Start: 2024-08-22 | End: 1900-01-01

## 2024-08-22 NOTE — ASSESSMENT
[FreeTextEntry1] : 53 year old female presents today with B trochanteric bursitis and Right Calcaneal heel tenderness MRI suggestive of LEFT nerve impingement but presents with RIGHT sided symptoms May consider follow up with Dr. GARCIA for Right SIJ injections possible Foot/ankle evaluation for right ankle/heel pain Discussed PT and CSI injection therapy for Trochanteric bursitis will give CSI today  Trigger Point Injection- The risks, benefits, contents and alternatives to injection were explained in full to the patient.  Risks outlined include but are not limited to infection, bleeding, scarring, skin discoloration, temporary increase in pain, syncopal episode, failure to resolve symptoms, allergic reaction, flare reaction, permanent white skin discoloration, symptom recurrence, and elevation of blood sugar in diabetics.  Patient understood the risks.  All questions were answered.  After discussion of options, patient requested an injection.  Oral informed consent was obtained and sterile prep was done of the injection site.  Sterile technique was used to introduce the mixture. The mixture consisted of:  4 cc 1% lidocaine 40 mg of depomedrol R lumbar paraspinal/SIJ Patient tolerated the procedure well.  Patient advised to ice the injection site this evening.  Signs and symptoms of infection reviewed and patient advised to call immediately for redness, fevers, and/or chills.   Greater Trochanteric Bursitis- The risks, benefits, contents and alternatives to injection were explained in full to the patient.  Risks outlined include but are not limited to infection, bleeding, scarring, skin discoloration, temporary increase in pain, syncopal episode, failure to resolve symptoms, allergic reaction, flare reaction, permanent white skin discoloration, symptom recurrence, and elevation of blood sugar in diabetics.  Patient understood the risks.  All questions were answered.  After discussion of options, patient requested an injection.  Oral informed consent was obtained and the injection site was prepped in a sterile fashion. The mixture consisted of:   4 cc 1% lidocaine, 40 mg of Depomedrol    The *R* trochanteric bursa was palpated to determine the site of maximal tenderness overlying the greater trochanter. The needle was advanced to contact the greater trochanter. The needle was withdrawn 1-2 mm and after negative aspiration, medication was injected.  The patient tolerated the procedure without any complications.

## 2024-08-22 NOTE — HISTORY OF PRESENT ILLNESS
[de-identified] : Dr. Duffy's Notes:  8/1/2024 ANTOLIN HASTINGS is a 53 year-old woman presenting for a RPV for a history of chronic low back pain. The patient underwent an L4-L5 ILESI w/o MAC on 7/18/2024. The patient notes having ~50% improvement of pain and function since the procedure. She does continue to have aching pain in the low back with radiation to the right gluteal region and posterolateral thigh. The patient continues to have ongoing pain in the right shoulder. The patient states that average pain over the past week was 5/10 in severity. Mood: No depression or anxiety. Sleep: Patient notes intermittent difficulty with sleep 2/2 pain.  6/6/2024: ANTOLIN HASTINGS is a 53 year-old woman presenting for a RPV for a history of chronic low back pain. The patient states that she recently started to develop pain in the right posterior neck with radiation to the posterior aspect of the right arm and dorsal forearm to the dorsal hand. She notes intermittent tingling and numbness. No focal weakness. She notes that pain is worse with internal rotation of the right shoulder. The patient also notes intermittent pain in the low back with intermittent radiation to the right groin. No numbness in the LEs. The patient states that average pain over the past week was 4/10 in severity. Mood: No depression or anxiety. Sleep: Patient notes intermittent difficulty with sleep 2/2 pain.  5/6/2024: ANTOLIN HASTINGS is a 53 year-old woman presenting for a RPV for a history of chronic low back pain. The patient underwent an L4-L5 ILESI w/o MAC on 4/23/2024. The patient notes having 70% improvement of pain and function since the procedure. She has been able to walk for much longer than previously. She patient notes having some pain across the low back intermittently with radiation to the right anterolateral thigh and anterior leg. No focal numbness or weakness in the lower extremities. No bowel or bladder incontinence or saddle anesthesia. The patient states that average pain over the past week was 3/10 in severity. Mood: Patient denies depression or anxiety. Sleep: Patient denies difficulty with sleep 2/2 pain.  4/4/2024: ANTOLIN HASTINGS is a 53 year-old woman presenting for a RPV (Dr. Mi) for a history of chronic low back pain with radicular features. The patient underwent a RIGHT L3-L4, L4-L5 TFESI on 3/12/2024 with Dr. Mi. She notes some short term improvement of pain after this procedure. She notes that she has been having pain in the low back and groin intermittently for many years. Pain has gotten progressively worse since ~2022. She went on a recent trip to [place] in 2023 when she did a lot of walking which further exacerbated her pain. At this point, the patient describes having an aching pain in the right low back with radiation to the right posterolateral thigh to the anterolateral leg and dorsal foot. She notes having intermittent numbness and tingling over this distribution. No focal weakness, bowel or bladder incontinence or saddle anesthesia. The patient states that average pain over the past week was 5/10 in severity. Mood: Patient notes ongoing frustration. No depression/anxiety. Sleep: Patient notes difficulty with sleep initiation and maintenance 2/2 pain.  Dr. Mi: 03/29/2024: s/p RIGHT L3-4 L4-5 TFESI on 03/12/24 with 50% relief and improvement of ADLs. Pain is less severe and more intermittent.  Initial HPI 02/16/2024: Pain started a few years ago but worsened over the past few months. Pain is on the RIGHT side of the lower back and radiates into the right buttock and down the right anterior and lateral thigh and lower leg to the calf described as a sharp shooting pain with associated numbness and tingling. ------------------------------------------------------------------------------------------------------------------------------------------------------------------------------- 2/7/2024 [facility] Lumbar MRI  - report noted in chart.  L1-L2: No disc bulging or herniation. No spinal canal or foraminal stenosis.  L2-L3: No disc bulging or herniation. No spinal canal or foraminal stenosis.  L3-L4: Mild disc bulge with superimposed small inferior left foraminal disc herniation (sagittal T2 FSE image 6, series 5). Mild left-sided foraminal narrowing. Right neural foramen patent. No spinal stenosis.  L4-L5: Mild disc bulging with superimposed small left foraminal disc herniation with mild/moderate left-sided foraminal narrowing. Right neural foramen patent. No spinal stenosis.  L5-S1: Minimal disc bulge. No disc herniation, spinal stenosis or foraminal narrowing. Ind. review- Agree, L NF narrowing L3/4, L4/5  ==================== 08/22/2024; 53 year old female that presents today with complaints of LBP and RLE pain and numbness that travels to the foot. Patient has been evaluated by pain management receiving injections with Dr. Mi and Dr. Duffy. Symptoms started 2 years ago. Receiving Right L3-4, L4-5 TFESI receiving minimal improvement, She was then evaluated by Dr. Duffy received a repeat injection which provided improvement but not long last. Most recent injection, L4-L5 ILESI w/o MAC on 7/18/2024, which again did not provide significant relief. She reports her activity is limited.   [] : no [FreeTextEntry7] : [facility] [de-identified] : MRI R

## 2024-08-22 NOTE — PHYSICAL EXAM
[FreeTextEntry8] : retrocalcaneal bursa tenderness in the RIGHT foot [de-identified] : retrocalcaneal bursa tenderness in the RIGHT foot [] : full range of motion of foot

## 2024-08-26 ENCOUNTER — APPOINTMENT (OUTPATIENT)
Dept: ORTHOPEDIC SURGERY | Facility: CLINIC | Age: 54
End: 2024-08-26
Payer: COMMERCIAL

## 2024-08-26 DIAGNOSIS — M25.511 PAIN IN RIGHT SHOULDER: ICD-10-CM

## 2024-08-26 DIAGNOSIS — G89.29 PAIN IN RIGHT SHOULDER: ICD-10-CM

## 2024-08-26 DIAGNOSIS — M75.51 BURSITIS OF RIGHT SHOULDER: ICD-10-CM

## 2024-08-26 PROCEDURE — 99204 OFFICE O/P NEW MOD 45 MIN: CPT | Mod: 25

## 2024-08-26 PROCEDURE — 73010 X-RAY EXAM OF SHOULDER BLADE: CPT | Mod: RT

## 2024-08-26 PROCEDURE — 99214 OFFICE O/P EST MOD 30 MIN: CPT | Mod: 25

## 2024-08-26 PROCEDURE — 20610 DRAIN/INJ JOINT/BURSA W/O US: CPT | Mod: RT

## 2024-08-26 PROCEDURE — 73030 X-RAY EXAM OF SHOULDER: CPT | Mod: RT

## 2024-08-29 ENCOUNTER — APPOINTMENT (OUTPATIENT)
Dept: PAIN MANAGEMENT | Facility: CLINIC | Age: 54
End: 2024-08-29

## 2024-09-02 PROBLEM — M75.51 BURSITIS OF RIGHT SHOULDER: Status: ACTIVE | Noted: 2024-09-02

## 2024-09-02 NOTE — DISCUSSION/SUMMARY
[de-identified] : - discussed the etiology/ pathophysiology of the patient's complaints today in layman's terms - reviewed treatment options, conservative and operative - discussed conservative treatment options including the role for anti-inflammatory medications, injections, bracing and therapy - reviewed r/b/a to these - patient requests CSI, tolerated without complication - activity modifications - continue PT - NSAIDs prn pain - f/u in 1 month or as needed

## 2024-09-02 NOTE — IMAGING
[de-identified] : Full neck range of motion - Spurling's - Edouard's  Shoulder (R / L) Normal muscle tone/ bulk Diffusely TTP   / 170 Abd 60 / 80 ER at side 60 / 60 IR T10 / T10 SILT throughout  Strength exam limited by pain 5/5 abduction 5/5 flexion in the plane of the scapula  5/5 external rotation 5/5 internal rotation  + Ponce's - Belly Press + Hawkin's  - Sands's  MRI right shoulder (7/29/24): Mild supraspinatus and infraspinatus tendinosis without high-grade or full-thickness tear.  Mild edema and fibrosis within the rotator interval which can be seen in the setting of adhesive capsulitis.  Mild subacromial-subdeltoid bursitis. 2 view right shoulder and 2 view right scapula: No acute fractures or malalignment, no significant degenerative change, type II acromion

## 2024-09-02 NOTE — PROCEDURE
[FreeTextEntry1] : Corticosteroid injection [FreeTextEntry2] : Right shoulder bursitis [FreeTextEntry3] : The risks and benefits of steroid injections were discussed. Verbal consent was obtained,  The site was prepped in a sterile fashion with alcohol A combination of 40 mg (1cc) of Kenalog and 2cc 1% xylocaine were injected under sterile conditions at the level of the right subacromial space Patient tolerated the procedure without complication and was counseled on post injection expectations.

## 2024-09-02 NOTE — HISTORY OF PRESENT ILLNESS
[de-identified] : 08/26/2024 ANTOLIN HASTINGS is a 53 year old female here today for: Location: Right Shoulder Complaint: The patient presents office for evaluation of 3 months of right shoulder pain.  She notes pain with overhead activities and during sleep.  She completed 1 month of physical therapy and continues to note pain.  She has a history of chronic pain treated with multiple injections and therapy previously.  She inquires today about a CSI. 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

## 2024-09-02 NOTE — DISCUSSION/SUMMARY
[de-identified] : - discussed the etiology/ pathophysiology of the patient's complaints today in layman's terms - reviewed treatment options, conservative and operative - discussed conservative treatment options including the role for anti-inflammatory medications, injections, bracing and therapy - reviewed r/b/a to these - patient requests CSI, tolerated without complication - activity modifications - continue PT - NSAIDs prn pain - f/u in 1 month or as needed

## 2024-09-02 NOTE — HISTORY OF PRESENT ILLNESS
[de-identified] : 08/26/2024 ANTOLIN HASTINGS is a 53 year old female here today for: Location: Right Shoulder Complaint: The patient presents office for evaluation of 3 months of right shoulder pain.  She notes pain with overhead activities and during sleep.  She completed 1 month of physical therapy and continues to note pain.  She has a history of chronic pain treated with multiple injections and therapy previously.  She inquires today about a CSI. 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

## 2024-09-02 NOTE — IMAGING
[de-identified] : Full neck range of motion - Spurling's - Edouard's  Shoulder (R / L) Normal muscle tone/ bulk Diffusely TTP   / 170 Abd 60 / 80 ER at side 60 / 60 IR T10 / T10 SILT throughout  Strength exam limited by pain 5/5 abduction 5/5 flexion in the plane of the scapula  5/5 external rotation 5/5 internal rotation  + Ponce's - Belly Press + Hawkin's  - Sands's  MRI right shoulder (7/29/24): Mild supraspinatus and infraspinatus tendinosis without high-grade or full-thickness tear.  Mild edema and fibrosis within the rotator interval which can be seen in the setting of adhesive capsulitis.  Mild subacromial-subdeltoid bursitis. 2 view right shoulder and 2 view right scapula: No acute fractures or malalignment, no significant degenerative change, type II acromion

## 2024-09-16 ENCOUNTER — APPOINTMENT (OUTPATIENT)
Dept: ORTHOPEDIC SURGERY | Facility: CLINIC | Age: 54
End: 2024-09-16

## 2024-09-16 ENCOUNTER — APPOINTMENT (OUTPATIENT)
Dept: PAIN MANAGEMENT | Facility: CLINIC | Age: 54
End: 2024-09-16

## 2024-09-23 ENCOUNTER — APPOINTMENT (OUTPATIENT)
Dept: ORTHOPEDIC SURGERY | Facility: CLINIC | Age: 54
End: 2024-09-23
Payer: COMMERCIAL

## 2024-09-23 DIAGNOSIS — M75.51 BURSITIS OF RIGHT SHOULDER: ICD-10-CM

## 2024-09-23 PROCEDURE — 99214 OFFICE O/P EST MOD 30 MIN: CPT

## 2024-09-23 RX ORDER — MELOXICAM 15 MG/1
15 TABLET ORAL
Qty: 30 | Refills: 0 | Status: ACTIVE | COMMUNITY
Start: 2024-09-23 | End: 1900-01-01

## 2024-09-23 NOTE — DISCUSSION/SUMMARY
[de-identified] : - discussed the etiology/ pathophysiology of the patient's complaints today in layman's terms - reviewed treatment options, conservative and operative - discussed conservative treatment options including the role for anti-inflammatory medications, injections, bracing and therapy - reviewed operative treatments including the role for shoulder arthroscopy as well as the postoperative expectations  - reviewed r/b/a to these - activity modifications - new PT Rx provided today  - NSAIDs prn pain - f/u in 1 month or as needed  I spent more than 30 minutes face-to-face with the patient today

## 2024-09-23 NOTE — HISTORY OF PRESENT ILLNESS
[de-identified] : 09/16/2024: patient returns for repeat evaluation of right shoulder. she notes  08/26/2024 ANTOLIN HASTINGS is a 53 year old female here today for: Location: Right Shoulder Complaint: The patient presents office for evaluation of 3 months of right shoulder pain.  She notes pain with overhead activities and during sleep.  She completed 1 month of physical therapy and continues to note pain.  She has a history of chronic pain treated with multiple injections and therapy previously.  She inquires today about a CSI. 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

## 2024-09-23 NOTE — DISCUSSION/SUMMARY
[de-identified] : - discussed the etiology/ pathophysiology of the patient's complaints today in layman's terms - reviewed treatment options, conservative and operative - discussed conservative treatment options including the role for anti-inflammatory medications, injections, bracing and therapy - reviewed r/b/a to these - patient requests CSI, tolerated without complication - activity modifications - continue PT - NSAIDs prn pain - f/u in 1 month or as needed

## 2024-09-23 NOTE — HISTORY OF PRESENT ILLNESS
[de-identified] : 9/23/24: The patient returns office today for repeat evaluation of her right shoulder pain.  She received a CSI nearly a month ago with improvement in her previous pain.  She is also taking diclofenac as needed for pain but continues to note pain with certain motions and overhead activities.  She is feeling very frustrated by her lack of improvement and persistent pain.  No new injuries, numbness or tingling.  08/26/2024 ANTOLIN HASTINGS is a 53 year old female here today for: Location: Right Shoulder Complaint: The patient presents office for evaluation of 3 months of right shoulder pain.  She notes pain with overhead activities and during sleep.  She completed 1 month of physical therapy and continues to note pain.  She has a history of chronic pain treated with multiple injections and therapy previously.  She inquires today about a CSI. 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

## 2024-09-23 NOTE — IMAGING
[de-identified] : Full neck range of motion - Spurling's - Edouard's  Shoulder (R / L) Normal muscle tone/ bulk Diffusely TTP   / 170 Abd 60 / 80 ER at side 60 / 60 IR T10 / T10 SILT throughout  Strength exam limited by pain 5/5 abduction 5/5 flexion in the plane of the scapula  5/5 external rotation 5/5 internal rotation  + Ponce's - Belly Press + Hawkin's  - Sands's  MRI right shoulder (7/29/24): Mild supraspinatus and infraspinatus tendinosis without high-grade or full-thickness tear.  Mild edema and fibrosis within the rotator interval which can be seen in the setting of adhesive capsulitis.  Mild subacromial-subdeltoid bursitis. 2 view right shoulder and 2 view right scapula: No acute fractures or malalignment, no significant degenerative change, type II acromion

## 2024-09-23 NOTE — IMAGING
[de-identified] : Full neck range of motion - Spurling's - Edouard's  Shoulder (R / L) Normal muscle tone/ bulk Diffusely TTP   / 170 Abd 60 / 80 ER at side 60 / 60 IR T10 / T10 SILT throughout  Strength exam limited by pain 5/5 abduction 5/5 flexion in the plane of the scapula  5/5 external rotation 5/5 internal rotation  + Ponce's - Belly Press + Hawkin's  - Sands's  MRI right shoulder (7/29/24): Mild supraspinatus and infraspinatus tendinosis without high-grade or full-thickness tear.  Mild edema and fibrosis within the rotator interval which can be seen in the setting of adhesive capsulitis.  Mild subacromial-subdeltoid bursitis. 2 view right shoulder and 2 view right scapula: No acute fractures or malalignment, no significant degenerative change, type II acromion

## 2024-10-03 ENCOUNTER — APPOINTMENT (OUTPATIENT)
Dept: ORTHOPEDIC SURGERY | Facility: CLINIC | Age: 54
End: 2024-10-03
Payer: COMMERCIAL

## 2024-10-03 DIAGNOSIS — M46.1 SACROILIITIS, NOT ELSEWHERE CLASSIFIED: ICD-10-CM

## 2024-10-03 DIAGNOSIS — M54.50 LOW BACK PAIN, UNSPECIFIED: ICD-10-CM

## 2024-10-03 PROCEDURE — 99213 OFFICE O/P EST LOW 20 MIN: CPT

## 2024-10-03 NOTE — PHYSICAL EXAM
[] : negative sitting straight leg raise [FreeTextEntry8] : retrocalcaneal bursa tenderness in the RIGHT foot [de-identified] : retrocalcaneal bursa tenderness in the RIGHT foot

## 2024-10-03 NOTE — ASSESSMENT
[FreeTextEntry1] : 53 year old female presents today with B trochanteric bursitis and Right Calcaneal heel tenderness MRI suggestive of LEFT nerve impingement but presents with RIGHT sided symptoms continue Physical therapy  Nsaids prn for pain f/u  as needed  f/u with hip specialist.

## 2024-10-03 NOTE — HISTORY OF PRESENT ILLNESS
[de-identified] : Dr. Duffy's Notes:  8/1/2024 ANTOLIN HASTINGS is a 53 year-old woman presenting for a RPV for a history of chronic low back pain. The patient underwent an L4-L5 ILESI w/o MAC on 7/18/2024. The patient notes having ~50% improvement of pain and function since the procedure. She does continue to have aching pain in the low back with radiation to the right gluteal region and posterolateral thigh. The patient continues to have ongoing pain in the right shoulder. The patient states that average pain over the past week was 5/10 in severity. Mood: No depression or anxiety. Sleep: Patient notes intermittent difficulty with sleep 2/2 pain.  6/6/2024: ANTOLIN HASTINGS is a 53 year-old woman presenting for a RPV for a history of chronic low back pain. The patient states that she recently started to develop pain in the right posterior neck with radiation to the posterior aspect of the right arm and dorsal forearm to the dorsal hand. She notes intermittent tingling and numbness. No focal weakness. She notes that pain is worse with internal rotation of the right shoulder. The patient also notes intermittent pain in the low back with intermittent radiation to the right groin. No numbness in the LEs. The patient states that average pain over the past week was 4/10 in severity. Mood: No depression or anxiety. Sleep: Patient notes intermittent difficulty with sleep 2/2 pain.  5/6/2024: ANTOLIN HASTINGS is a 53 year-old woman presenting for a RPV for a history of chronic low back pain. The patient underwent an L4-L5 ILESI w/o MAC on 4/23/2024. The patient notes having 70% improvement of pain and function since the procedure. She has been able to walk for much longer than previously. She patient notes having some pain across the low back intermittently with radiation to the right anterolateral thigh and anterior leg. No focal numbness or weakness in the lower extremities. No bowel or bladder incontinence or saddle anesthesia. The patient states that average pain over the past week was 3/10 in severity. Mood: Patient denies depression or anxiety. Sleep: Patient denies difficulty with sleep 2/2 pain.  4/4/2024: ANTOLIN HASTINGS is a 53 year-old woman presenting for a RPV (Dr. Mi) for a history of chronic low back pain with radicular features. The patient underwent a RIGHT L3-L4, L4-L5 TFESI on 3/12/2024 with Dr. Mi. She notes some short term improvement of pain after this procedure. She notes that she has been having pain in the low back and groin intermittently for many years. Pain has gotten progressively worse since ~2022. She went on a recent trip to [place] in 2023 when she did a lot of walking which further exacerbated her pain. At this point, the patient describes having an aching pain in the right low back with radiation to the right posterolateral thigh to the anterolateral leg and dorsal foot. She notes having intermittent numbness and tingling over this distribution. No focal weakness, bowel or bladder incontinence or saddle anesthesia. The patient states that average pain over the past week was 5/10 in severity. Mood: Patient notes ongoing frustration. No depression/anxiety. Sleep: Patient notes difficulty with sleep initiation and maintenance 2/2 pain.  Dr. Mi: 03/29/2024: s/p RIGHT L3-4 L4-5 TFESI on 03/12/24 with 50% relief and improvement of ADLs. Pain is less severe and more intermittent.  Initial HPI 02/16/2024: Pain started a few years ago but worsened over the past few months. Pain is on the RIGHT side of the lower back and radiates into the right buttock and down the right anterior and lateral thigh and lower leg to the calf described as a sharp shooting pain with associated numbness and tingling. ------------------------------------------------------------------------------------------------------------------------------------------------------------------------------- 2/7/2024 [facility] Lumbar MRI  - report noted in chart.  L1-L2: No disc bulging or herniation. No spinal canal or foraminal stenosis.  L2-L3: No disc bulging or herniation. No spinal canal or foraminal stenosis.  L3-L4: Mild disc bulge with superimposed small inferior left foraminal disc herniation (sagittal T2 FSE image 6, series 5). Mild left-sided foraminal narrowing. Right neural foramen patent. No spinal stenosis.  L4-L5: Mild disc bulging with superimposed small left foraminal disc herniation with mild/moderate left-sided foraminal narrowing. Right neural foramen patent. No spinal stenosis.  L5-S1: Minimal disc bulge. No disc herniation, spinal stenosis or foraminal narrowing. Ind. review- Agree, L NF narrowing L3/4, L4/5  ==================== 08/22/2024; 53 year old female that presents today with complaints of LBP and RLE pain and numbness that travels to the foot. Patient has been evaluated by pain management receiving injections with Dr. Mi and Dr. Duffy. Symptoms started 2 years ago. Receiving Right L3-4, L4-5 TFESI receiving minimal improvement, She was then evaluated by Dr. Duffy received a repeat injection which provided improvement but not long last. Most recent injection, L4-L5 ILESI w/o MAC on 7/18/2024, which again did not provide significant relief. She reports her activity is limited.  10/03/2024: Patient is here to follow up on lower back/bursitis pain, reports improvement in pain and mobility after last visit TPI >50% improvement. Patient has been taking diclofenac. Patient is in PT currently with relief.   [] : no [FreeTextEntry7] : [facility] [de-identified] : MRI R

## 2024-11-13 ENCOUNTER — APPOINTMENT (OUTPATIENT)
Dept: ORTHOPEDIC SURGERY | Facility: CLINIC | Age: 54
End: 2024-11-13
Payer: COMMERCIAL

## 2024-11-13 DIAGNOSIS — M16.31 UNILATERAL OSTEOARTHRITIS RESULTING FROM HIP DYSPLASIA, RIGHT HIP: ICD-10-CM

## 2024-11-13 PROCEDURE — J3490M: CUSTOM

## 2024-11-13 PROCEDURE — 99204 OFFICE O/P NEW MOD 45 MIN: CPT | Mod: 25

## 2024-11-13 PROCEDURE — 73522 X-RAY EXAM HIPS BI 3-4 VIEWS: CPT

## 2024-11-13 PROCEDURE — 20611 DRAIN/INJ JOINT/BURSA W/US: CPT | Mod: RT

## 2024-11-18 ENCOUNTER — APPOINTMENT (OUTPATIENT)
Dept: ORTHOPEDIC SURGERY | Facility: CLINIC | Age: 54
End: 2024-11-18
Payer: COMMERCIAL

## 2024-11-18 DIAGNOSIS — M25.511 PAIN IN RIGHT SHOULDER: ICD-10-CM

## 2024-11-18 DIAGNOSIS — G89.29 PAIN IN RIGHT SHOULDER: ICD-10-CM

## 2024-11-18 DIAGNOSIS — M54.12 RADICULOPATHY, CERVICAL REGION: ICD-10-CM

## 2024-11-18 DIAGNOSIS — M75.51 BURSITIS OF RIGHT SHOULDER: ICD-10-CM

## 2024-11-18 PROCEDURE — 99213 OFFICE O/P EST LOW 20 MIN: CPT

## 2024-11-27 ENCOUNTER — APPOINTMENT (OUTPATIENT)
Dept: NEUROLOGY | Facility: CLINIC | Age: 54
End: 2024-11-27
Payer: COMMERCIAL

## 2024-11-27 PROCEDURE — 95910 NRV CNDJ TEST 7-8 STUDIES: CPT

## 2024-11-27 PROCEDURE — 95886 MUSC TEST DONE W/N TEST COMP: CPT

## 2024-11-27 PROCEDURE — 95885 MUSC TST DONE W/NERV TST LIM: CPT | Mod: LT

## 2024-12-02 ENCOUNTER — APPOINTMENT (OUTPATIENT)
Dept: ORTHOPEDIC SURGERY | Facility: CLINIC | Age: 54
End: 2024-12-02
Payer: COMMERCIAL

## 2024-12-02 DIAGNOSIS — M25.511 PAIN IN RIGHT SHOULDER: ICD-10-CM

## 2024-12-02 DIAGNOSIS — M54.12 RADICULOPATHY, CERVICAL REGION: ICD-10-CM

## 2024-12-02 DIAGNOSIS — G89.29 PAIN IN RIGHT SHOULDER: ICD-10-CM

## 2024-12-02 PROCEDURE — 99213 OFFICE O/P EST LOW 20 MIN: CPT

## 2024-12-11 ENCOUNTER — APPOINTMENT (OUTPATIENT)
Dept: ORTHOPEDIC SURGERY | Facility: CLINIC | Age: 54
End: 2024-12-11
Payer: COMMERCIAL

## 2024-12-11 DIAGNOSIS — M16.31 UNILATERAL OSTEOARTHRITIS RESULTING FROM HIP DYSPLASIA, RIGHT HIP: ICD-10-CM

## 2024-12-11 DIAGNOSIS — M46.1 SACROILIITIS, NOT ELSEWHERE CLASSIFIED: ICD-10-CM

## 2024-12-11 DIAGNOSIS — M51.16 INTERVERTEBRAL DISC DISORDERS WITH RADICULOPATHY, LUMBAR REGION: ICD-10-CM

## 2024-12-11 PROCEDURE — 99214 OFFICE O/P EST MOD 30 MIN: CPT

## 2024-12-11 RX ORDER — CYCLOBENZAPRINE HYDROCHLORIDE 5 MG/1
5 TABLET, FILM COATED ORAL 3 TIMES DAILY
Qty: 30 | Refills: 1 | Status: ACTIVE | COMMUNITY
Start: 2024-12-11 | End: 1900-01-01

## 2024-12-20 RX ORDER — LIDOCAINE 5% 700 MG/1
5 PATCH TOPICAL
Qty: 30 | Refills: 1 | Status: ACTIVE | COMMUNITY
Start: 2024-12-20 | End: 1900-01-01

## 2024-12-23 ENCOUNTER — APPOINTMENT (OUTPATIENT)
Dept: PAIN MANAGEMENT | Facility: CLINIC | Age: 54
End: 2024-12-23
Payer: COMMERCIAL

## 2024-12-23 VITALS — BODY MASS INDEX: 22.62 KG/M2 | WEIGHT: 158 LBS | HEIGHT: 70 IN

## 2024-12-23 DIAGNOSIS — M54.17 RADICULOPATHY, LUMBOSACRAL REGION: ICD-10-CM

## 2024-12-23 DIAGNOSIS — M51.16 INTERVERTEBRAL DISC DISORDERS WITH RADICULOPATHY, LUMBAR REGION: ICD-10-CM

## 2024-12-23 DIAGNOSIS — M46.1 SACROILIITIS, NOT ELSEWHERE CLASSIFIED: ICD-10-CM

## 2024-12-23 DIAGNOSIS — M54.50 LOW BACK PAIN, UNSPECIFIED: ICD-10-CM

## 2024-12-23 PROCEDURE — 99214 OFFICE O/P EST MOD 30 MIN: CPT

## 2025-01-03 ENCOUNTER — APPOINTMENT (OUTPATIENT)
Facility: CLINIC | Age: 55
End: 2025-01-03
Payer: COMMERCIAL

## 2025-01-03 PROCEDURE — 95912 NRV CNDJ TEST 11-12 STUDIES: CPT

## 2025-01-03 PROCEDURE — 95886 MUSC TEST DONE W/N TEST COMP: CPT

## 2025-01-14 ENCOUNTER — APPOINTMENT (OUTPATIENT)
Dept: PAIN MANAGEMENT | Facility: CLINIC | Age: 55
End: 2025-01-14
Payer: COMMERCIAL

## 2025-01-14 DIAGNOSIS — M46.1 SACROILIITIS, NOT ELSEWHERE CLASSIFIED: ICD-10-CM

## 2025-01-14 PROCEDURE — 27096 INJECT SACROILIAC JOINT: CPT | Mod: RT

## 2025-01-14 PROCEDURE — J3490M: CUSTOM

## 2025-01-14 PROCEDURE — 81025 URINE PREGNANCY TEST: CPT

## 2025-02-11 ENCOUNTER — APPOINTMENT (OUTPATIENT)
Dept: PAIN MANAGEMENT | Facility: CLINIC | Age: 55
End: 2025-02-11
Payer: COMMERCIAL

## 2025-02-11 DIAGNOSIS — M54.12 RADICULOPATHY, CERVICAL REGION: ICD-10-CM

## 2025-02-11 PROCEDURE — 62321 NJX INTERLAMINAR CRV/THRC: CPT

## 2025-04-03 ENCOUNTER — APPOINTMENT (OUTPATIENT)
Dept: PAIN MANAGEMENT | Facility: CLINIC | Age: 55
End: 2025-04-03
Payer: COMMERCIAL

## 2025-04-03 VITALS — WEIGHT: 161 LBS | HEIGHT: 70 IN | BODY MASS INDEX: 23.05 KG/M2

## 2025-04-03 DIAGNOSIS — M54.12 RADICULOPATHY, CERVICAL REGION: ICD-10-CM

## 2025-04-03 DIAGNOSIS — M46.1 SACROILIITIS, NOT ELSEWHERE CLASSIFIED: ICD-10-CM

## 2025-04-03 DIAGNOSIS — M54.17 RADICULOPATHY, LUMBOSACRAL REGION: ICD-10-CM

## 2025-04-03 DIAGNOSIS — G89.29 PAIN IN RIGHT SHOULDER: ICD-10-CM

## 2025-04-03 DIAGNOSIS — M25.511 PAIN IN RIGHT SHOULDER: ICD-10-CM

## 2025-04-03 PROCEDURE — 99214 OFFICE O/P EST MOD 30 MIN: CPT

## 2025-05-12 ENCOUNTER — NON-APPOINTMENT (OUTPATIENT)
Age: 55
End: 2025-05-12

## 2025-05-30 ENCOUNTER — RX RENEWAL (OUTPATIENT)
Age: 55
End: 2025-05-30

## 2025-06-16 ENCOUNTER — APPOINTMENT (OUTPATIENT)
Dept: PAIN MANAGEMENT | Facility: CLINIC | Age: 55
End: 2025-06-16
Payer: COMMERCIAL

## 2025-06-16 VITALS — BODY MASS INDEX: 22.9 KG/M2 | WEIGHT: 160 LBS | HEIGHT: 70 IN

## 2025-06-16 PROCEDURE — 99214 OFFICE O/P EST MOD 30 MIN: CPT

## 2025-07-14 ENCOUNTER — APPOINTMENT (OUTPATIENT)
Dept: PAIN MANAGEMENT | Facility: CLINIC | Age: 55
End: 2025-07-14
Payer: COMMERCIAL

## 2025-07-14 VITALS — HEIGHT: 70 IN | BODY MASS INDEX: 23.34 KG/M2 | WEIGHT: 163 LBS

## 2025-07-14 PROCEDURE — 99214 OFFICE O/P EST MOD 30 MIN: CPT

## 2025-08-15 DIAGNOSIS — M25.552 PAIN IN RIGHT HIP: ICD-10-CM

## 2025-08-15 DIAGNOSIS — M25.551 PAIN IN RIGHT HIP: ICD-10-CM

## 2025-08-26 ENCOUNTER — APPOINTMENT (OUTPATIENT)
Dept: PAIN MANAGEMENT | Facility: CLINIC | Age: 55
End: 2025-08-26
Payer: COMMERCIAL

## 2025-08-26 DIAGNOSIS — M46.1 SACROILIITIS, NOT ELSEWHERE CLASSIFIED: ICD-10-CM

## 2025-08-26 PROCEDURE — 27096 INJECT SACROILIAC JOINT: CPT | Mod: RT

## 2025-08-26 PROCEDURE — J3490M: CUSTOM

## 2025-08-26 PROCEDURE — 81025 URINE PREGNANCY TEST: CPT

## 2025-08-27 ENCOUNTER — APPOINTMENT (OUTPATIENT)
Dept: ORTHOPEDIC SURGERY | Facility: CLINIC | Age: 55
End: 2025-08-27

## 2025-09-15 ENCOUNTER — APPOINTMENT (OUTPATIENT)
Dept: PAIN MANAGEMENT | Facility: CLINIC | Age: 55
End: 2025-09-15
Payer: COMMERCIAL

## 2025-09-15 VITALS — WEIGHT: 162 LBS | HEIGHT: 70 IN | BODY MASS INDEX: 23.19 KG/M2

## 2025-09-15 DIAGNOSIS — M46.1 SACROILIITIS, NOT ELSEWHERE CLASSIFIED: ICD-10-CM

## 2025-09-15 DIAGNOSIS — M16.11 UNILATERAL PRIMARY OSTEOARTHRITIS, RIGHT HIP: ICD-10-CM

## 2025-09-15 DIAGNOSIS — S73.191D OTHER SPRAIN OF RIGHT HIP, SUBSEQUENT ENCOUNTER: ICD-10-CM

## 2025-09-15 PROCEDURE — 99214 OFFICE O/P EST MOD 30 MIN: CPT
